# Patient Record
Sex: FEMALE | Race: WHITE | NOT HISPANIC OR LATINO
[De-identification: names, ages, dates, MRNs, and addresses within clinical notes are randomized per-mention and may not be internally consistent; named-entity substitution may affect disease eponyms.]

---

## 2017-02-23 ENCOUNTER — APPOINTMENT (OUTPATIENT)
Dept: OBGYN | Facility: CLINIC | Age: 32
End: 2017-02-23

## 2017-02-23 VITALS — SYSTOLIC BLOOD PRESSURE: 122 MMHG | HEIGHT: 65 IN | DIASTOLIC BLOOD PRESSURE: 82 MMHG

## 2017-02-23 DIAGNOSIS — Z30.9 ENCOUNTER FOR CONTRACEPTIVE MANAGEMENT, UNSPECIFIED: ICD-10-CM

## 2017-02-23 DIAGNOSIS — T19.2XXA FOREIGN BODY IN VULVA AND VAGINA, INITIAL ENCOUNTER: ICD-10-CM

## 2017-02-23 DIAGNOSIS — N92.0 EXCESSIVE AND FREQUENT MENSTRUATION WITH REGULAR CYCLE: ICD-10-CM

## 2017-02-23 RX ORDER — FAMOTIDINE 40 MG/1
40 TABLET, FILM COATED ORAL
Qty: 30 | Refills: 0 | Status: COMPLETED | COMMUNITY
Start: 2016-12-08

## 2017-02-23 RX ORDER — FLUCONAZOLE 150 MG/1
150 TABLET ORAL
Qty: 1 | Refills: 0 | Status: COMPLETED | COMMUNITY
Start: 2016-10-11

## 2017-02-23 RX ORDER — CEPHALEXIN 500 MG/1
500 CAPSULE ORAL
Qty: 40 | Refills: 0 | Status: COMPLETED | COMMUNITY
Start: 2016-10-27

## 2017-02-23 RX ORDER — HYDROCODONE BITARTRATE AND ACETAMINOPHEN 7.5; 325 MG/1; MG/1
7.5-325 TABLET ORAL
Qty: 10 | Refills: 0 | Status: COMPLETED | COMMUNITY
Start: 2017-02-11

## 2017-02-23 RX ORDER — AMOXICILLIN AND CLAVULANATE POTASSIUM 875; 125 MG/1; MG/1
875-125 TABLET, COATED ORAL
Qty: 20 | Refills: 0 | Status: COMPLETED | COMMUNITY
Start: 2016-12-08

## 2017-02-28 ENCOUNTER — APPOINTMENT (OUTPATIENT)
Dept: OBGYN | Facility: CLINIC | Age: 32
End: 2017-02-28

## 2017-02-28 DIAGNOSIS — Z30.430 ENCOUNTER FOR INSERTION OF INTRAUTERINE CONTRACEPTIVE DEVICE: ICD-10-CM

## 2017-03-28 ENCOUNTER — APPOINTMENT (OUTPATIENT)
Dept: OBGYN | Facility: CLINIC | Age: 32
End: 2017-03-28

## 2017-04-27 ENCOUNTER — APPOINTMENT (OUTPATIENT)
Dept: OBGYN | Facility: CLINIC | Age: 32
End: 2017-04-27

## 2017-04-27 VITALS
DIASTOLIC BLOOD PRESSURE: 79 MMHG | BODY MASS INDEX: 22.49 KG/M2 | WEIGHT: 135 LBS | SYSTOLIC BLOOD PRESSURE: 119 MMHG | HEIGHT: 65 IN

## 2017-04-27 DIAGNOSIS — N93.8 OTHER SPECIFIED ABNORMAL UTERINE AND VAGINAL BLEEDING: ICD-10-CM

## 2017-04-27 RX ORDER — MEDROXYPROGESTERONE ACETATE 10 MG/1
10 TABLET ORAL DAILY
Qty: 10 | Refills: 1 | Status: ACTIVE | COMMUNITY
Start: 2017-04-27 | End: 1900-01-01

## 2017-05-01 ENCOUNTER — APPOINTMENT (OUTPATIENT)
Dept: OBGYN | Facility: CLINIC | Age: 32
End: 2017-05-01

## 2017-07-14 ENCOUNTER — APPOINTMENT (OUTPATIENT)
Dept: OBGYN | Facility: CLINIC | Age: 32
End: 2017-07-14

## 2020-05-23 ENCOUNTER — MESSAGE (OUTPATIENT)
Age: 35
End: 2020-05-23

## 2020-05-23 ENCOUNTER — APPOINTMENT (OUTPATIENT)
Dept: DISASTER EMERGENCY | Facility: CLINIC | Age: 35
End: 2020-05-23

## 2020-05-23 LAB
SARS-COV-2 IGG SERPL IA-ACNC: 0.1 INDEX
SARS-COV-2 IGG SERPL QL IA: NEGATIVE

## 2020-10-07 ENCOUNTER — APPOINTMENT (OUTPATIENT)
Dept: ORTHOPEDIC SURGERY | Facility: CLINIC | Age: 35
End: 2020-10-07

## 2020-10-07 DIAGNOSIS — M54.5 LOW BACK PAIN: ICD-10-CM

## 2020-12-28 ENCOUNTER — TRANSCRIPTION ENCOUNTER (OUTPATIENT)
Age: 35
End: 2020-12-28

## 2021-09-29 ENCOUNTER — TRANSCRIPTION ENCOUNTER (OUTPATIENT)
Age: 36
End: 2021-09-29

## 2021-10-13 ENCOUNTER — RESULT REVIEW (OUTPATIENT)
Age: 36
End: 2021-10-13

## 2025-03-06 ENCOUNTER — EMERGENCY (EMERGENCY)
Facility: HOSPITAL | Age: 40
LOS: 1 days | Discharge: ANOTHER HOSPITAL | End: 2025-03-06
Attending: EMERGENCY MEDICINE | Admitting: EMERGENCY MEDICINE
Payer: MEDICAID

## 2025-03-06 ENCOUNTER — INPATIENT (INPATIENT)
Facility: HOSPITAL | Age: 40
LOS: 1 days | Discharge: HOME | End: 2025-03-08
Attending: STUDENT IN AN ORGANIZED HEALTH CARE EDUCATION/TRAINING PROGRAM | Admitting: STUDENT IN AN ORGANIZED HEALTH CARE EDUCATION/TRAINING PROGRAM
Payer: MEDICAID

## 2025-03-06 VITALS
RESPIRATION RATE: 17 BRPM | TEMPERATURE: 98 F | DIASTOLIC BLOOD PRESSURE: 88 MMHG | SYSTOLIC BLOOD PRESSURE: 126 MMHG | HEART RATE: 111 BPM | OXYGEN SATURATION: 97 % | WEIGHT: 139.99 LBS | HEIGHT: 65 IN

## 2025-03-06 VITALS
RESPIRATION RATE: 18 BRPM | SYSTOLIC BLOOD PRESSURE: 131 MMHG | DIASTOLIC BLOOD PRESSURE: 78 MMHG | HEART RATE: 114 BPM | OXYGEN SATURATION: 100 %

## 2025-03-06 VITALS
DIASTOLIC BLOOD PRESSURE: 90 MMHG | SYSTOLIC BLOOD PRESSURE: 123 MMHG | TEMPERATURE: 99 F | OXYGEN SATURATION: 96 % | HEIGHT: 65 IN | RESPIRATION RATE: 18 BRPM | WEIGHT: 141.1 LBS | HEART RATE: 126 BPM

## 2025-03-06 LAB
ALBUMIN SERPL ELPH-MCNC: 1.6 G/DL — LOW (ref 3.3–5)
ALP SERPL-CCNC: 230 U/L — HIGH (ref 40–120)
ALT FLD-CCNC: 30 U/L — SIGNIFICANT CHANGE UP (ref 10–45)
ANION GAP SERPL CALC-SCNC: 7 MMOL/L — SIGNIFICANT CHANGE UP (ref 5–17)
APPEARANCE UR: CLEAR — SIGNIFICANT CHANGE UP
APTT BLD: 27.4 SEC — SIGNIFICANT CHANGE UP (ref 24.5–35.6)
AST SERPL-CCNC: 17 U/L — SIGNIFICANT CHANGE UP (ref 10–40)
BACTERIA # UR AUTO: ABNORMAL /HPF
BASOPHILS # BLD AUTO: 0.07 K/UL — SIGNIFICANT CHANGE UP (ref 0–0.2)
BASOPHILS NFR BLD AUTO: 0.3 % — SIGNIFICANT CHANGE UP (ref 0–2)
BILIRUB SERPL-MCNC: 2.1 MG/DL — HIGH (ref 0.2–1.2)
BILIRUB UR-MCNC: ABNORMAL
BUN SERPL-MCNC: 23 MG/DL — SIGNIFICANT CHANGE UP (ref 7–23)
CALCIUM SERPL-MCNC: 9.1 MG/DL — SIGNIFICANT CHANGE UP (ref 8.4–10.5)
CHLORIDE SERPL-SCNC: 102 MMOL/L — SIGNIFICANT CHANGE UP (ref 96–108)
CO2 SERPL-SCNC: 26 MMOL/L — SIGNIFICANT CHANGE UP (ref 22–31)
COLOR SPEC: SIGNIFICANT CHANGE UP
CREAT SERPL-MCNC: 1.45 MG/DL — HIGH (ref 0.5–1.3)
DIFF PNL FLD: ABNORMAL
EGFR: 47 ML/MIN/1.73M2 — LOW
EOSINOPHIL # BLD AUTO: 0.08 K/UL — SIGNIFICANT CHANGE UP (ref 0–0.5)
EOSINOPHIL NFR BLD AUTO: 0.3 % — SIGNIFICANT CHANGE UP (ref 0–6)
EPI CELLS # UR: PRESENT
FLUAV AG NPH QL: SIGNIFICANT CHANGE UP
FLUBV AG NPH QL: SIGNIFICANT CHANGE UP
GLUCOSE SERPL-MCNC: 102 MG/DL — HIGH (ref 70–99)
GLUCOSE UR QL: NEGATIVE MG/DL — SIGNIFICANT CHANGE UP
HCG SERPL-ACNC: <1 MIU/ML — SIGNIFICANT CHANGE UP
HCT VFR BLD CALC: 30.4 % — LOW (ref 34.5–45)
HGB BLD-MCNC: 10.2 G/DL — LOW (ref 11.5–15.5)
IMM GRANULOCYTES NFR BLD AUTO: 0.5 % — SIGNIFICANT CHANGE UP (ref 0–0.9)
INR BLD: 1.51 RATIO — HIGH (ref 0.85–1.16)
KETONES UR-MCNC: NEGATIVE MG/DL — SIGNIFICANT CHANGE UP
LACTATE SERPL-SCNC: 0.9 MMOL/L — SIGNIFICANT CHANGE UP (ref 0.7–2)
LEUKOCYTE ESTERASE UR-ACNC: ABNORMAL
LIDOCAIN IGE QN: 45 U/L — SIGNIFICANT CHANGE UP (ref 16–77)
LYMPHOCYTES # BLD AUTO: 1.16 K/UL — SIGNIFICANT CHANGE UP (ref 1–3.3)
LYMPHOCYTES # BLD AUTO: 4.4 % — LOW (ref 13–44)
MCHC RBC-ENTMCNC: 28.8 PG — SIGNIFICANT CHANGE UP (ref 27–34)
MCHC RBC-ENTMCNC: 33.6 G/DL — SIGNIFICANT CHANGE UP (ref 32–36)
MCV RBC AUTO: 85.9 FL — SIGNIFICANT CHANGE UP (ref 80–100)
MONOCYTES # BLD AUTO: 2.02 K/UL — HIGH (ref 0–0.9)
MONOCYTES NFR BLD AUTO: 7.7 % — SIGNIFICANT CHANGE UP (ref 2–14)
NEUTROPHILS # BLD AUTO: 22.75 K/UL — HIGH (ref 1.8–7.4)
NEUTROPHILS NFR BLD AUTO: 86.8 % — HIGH (ref 43–77)
NITRITE UR-MCNC: POSITIVE
NRBC BLD AUTO-RTO: 0 /100 WBCS — SIGNIFICANT CHANGE UP (ref 0–0)
PH UR: 6.5 — SIGNIFICANT CHANGE UP (ref 5–8)
PLATELET # BLD AUTO: 492 K/UL — HIGH (ref 150–400)
POTASSIUM SERPL-MCNC: 3.4 MMOL/L — LOW (ref 3.5–5.3)
POTASSIUM SERPL-SCNC: 3.4 MMOL/L — LOW (ref 3.5–5.3)
PROCALCITONIN SERPL-MCNC: 2.13 NG/ML — HIGH
PROT SERPL-MCNC: 5.9 G/DL — LOW (ref 6–8.3)
PROT UR-MCNC: 100 MG/DL
PROTHROM AB SERPL-ACNC: 17.7 SEC — HIGH (ref 9.9–13.4)
RBC # BLD: 3.54 M/UL — LOW (ref 3.8–5.2)
RBC # FLD: 13.3 % — SIGNIFICANT CHANGE UP (ref 10.3–14.5)
RBC CASTS # UR COMP ASSIST: 5 /HPF — HIGH (ref 0–4)
RSV RNA NPH QL NAA+NON-PROBE: SIGNIFICANT CHANGE UP
SARS-COV-2 RNA SPEC QL NAA+PROBE: SIGNIFICANT CHANGE UP
SODIUM SERPL-SCNC: 135 MMOL/L — SIGNIFICANT CHANGE UP (ref 135–145)
SP GR SPEC: 1.01 — SIGNIFICANT CHANGE UP (ref 1–1.03)
UROBILINOGEN FLD QL: 1 MG/DL — SIGNIFICANT CHANGE UP (ref 0.2–1)
WBC # BLD: 26.21 K/UL — HIGH (ref 3.8–10.5)
WBC # FLD AUTO: 26.21 K/UL — HIGH (ref 3.8–10.5)
WBC UR QL: 10 /HPF — HIGH (ref 0–5)

## 2025-03-06 PROCEDURE — 84145 PROCALCITONIN (PCT): CPT

## 2025-03-06 PROCEDURE — 85025 COMPLETE CBC W/AUTO DIFF WBC: CPT

## 2025-03-06 PROCEDURE — 96375 TX/PRO/DX INJ NEW DRUG ADDON: CPT

## 2025-03-06 PROCEDURE — 99285 EMERGENCY DEPT VISIT HI MDM: CPT

## 2025-03-06 PROCEDURE — 80053 COMPREHEN METABOLIC PANEL: CPT

## 2025-03-06 PROCEDURE — 85730 THROMBOPLASTIN TIME PARTIAL: CPT

## 2025-03-06 PROCEDURE — 99285 EMERGENCY DEPT VISIT HI MDM: CPT | Mod: 25

## 2025-03-06 PROCEDURE — 36415 COLL VENOUS BLD VENIPUNCTURE: CPT

## 2025-03-06 PROCEDURE — 84702 CHORIONIC GONADOTROPIN TEST: CPT

## 2025-03-06 PROCEDURE — 87086 URINE CULTURE/COLONY COUNT: CPT

## 2025-03-06 PROCEDURE — 96361 HYDRATE IV INFUSION ADD-ON: CPT

## 2025-03-06 PROCEDURE — 85610 PROTHROMBIN TIME: CPT

## 2025-03-06 PROCEDURE — 81001 URINALYSIS AUTO W/SCOPE: CPT

## 2025-03-06 PROCEDURE — 74177 CT ABD & PELVIS W/CONTRAST: CPT | Mod: 26

## 2025-03-06 PROCEDURE — 96365 THER/PROPH/DIAG IV INF INIT: CPT | Mod: XU

## 2025-03-06 PROCEDURE — 74177 CT ABD & PELVIS W/CONTRAST: CPT | Mod: MC

## 2025-03-06 PROCEDURE — 87040 BLOOD CULTURE FOR BACTERIA: CPT

## 2025-03-06 PROCEDURE — 83690 ASSAY OF LIPASE: CPT

## 2025-03-06 PROCEDURE — 87637 SARSCOV2&INF A&B&RSV AMP PRB: CPT

## 2025-03-06 PROCEDURE — 96376 TX/PRO/DX INJ SAME DRUG ADON: CPT

## 2025-03-06 PROCEDURE — 83605 ASSAY OF LACTIC ACID: CPT

## 2025-03-06 RX ORDER — ACETAMINOPHEN 500 MG/5ML
1000 LIQUID (ML) ORAL ONCE
Refills: 0 | Status: COMPLETED | OUTPATIENT
Start: 2025-03-06 | End: 2025-03-06

## 2025-03-06 RX ORDER — CEFTRIAXONE 500 MG/1
1000 INJECTION, POWDER, FOR SOLUTION INTRAMUSCULAR; INTRAVENOUS ONCE
Refills: 0 | Status: COMPLETED | OUTPATIENT
Start: 2025-03-06 | End: 2025-03-06

## 2025-03-06 RX ORDER — ONDANSETRON HCL/PF 4 MG/2 ML
4 VIAL (ML) INJECTION ONCE
Refills: 0 | Status: COMPLETED | OUTPATIENT
Start: 2025-03-06 | End: 2025-03-06

## 2025-03-06 RX ORDER — KETOROLAC TROMETHAMINE 30 MG/ML
15 INJECTION, SOLUTION INTRAMUSCULAR; INTRAVENOUS ONCE
Refills: 0 | Status: DISCONTINUED | OUTPATIENT
Start: 2025-03-06 | End: 2025-03-06

## 2025-03-06 RX ORDER — OXYCODONE HYDROCHLORIDE 30 MG/1
5 TABLET ORAL ONCE
Refills: 0 | Status: DISCONTINUED | OUTPATIENT
Start: 2025-03-06 | End: 2025-03-06

## 2025-03-06 RX ADMIN — Medication 4 MILLIGRAM(S): at 21:27

## 2025-03-06 RX ADMIN — Medication 1000 MILLILITER(S): at 19:27

## 2025-03-06 RX ADMIN — KETOROLAC TROMETHAMINE 15 MILLIGRAM(S): 30 INJECTION, SOLUTION INTRAMUSCULAR; INTRAVENOUS at 20:38

## 2025-03-06 RX ADMIN — Medication 4 MILLIGRAM(S): at 18:24

## 2025-03-06 RX ADMIN — Medication 1000 MILLILITER(S): at 18:26

## 2025-03-06 RX ADMIN — OXYCODONE HYDROCHLORIDE 5 MILLIGRAM(S): 30 TABLET ORAL at 21:57

## 2025-03-06 RX ADMIN — OXYCODONE HYDROCHLORIDE 5 MILLIGRAM(S): 30 TABLET ORAL at 21:27

## 2025-03-06 RX ADMIN — KETOROLAC TROMETHAMINE 15 MILLIGRAM(S): 30 INJECTION, SOLUTION INTRAMUSCULAR; INTRAVENOUS at 20:08

## 2025-03-06 RX ADMIN — Medication 400 MILLIGRAM(S): at 18:25

## 2025-03-06 RX ADMIN — Medication 1000 MILLIGRAM(S): at 18:40

## 2025-03-06 RX ADMIN — Medication 1000 MILLIGRAM(S): at 18:55

## 2025-03-06 RX ADMIN — CEFTRIAXONE 100 MILLIGRAM(S): 500 INJECTION, POWDER, FOR SOLUTION INTRAMUSCULAR; INTRAVENOUS at 18:36

## 2025-03-06 RX ADMIN — Medication 1000 MILLILITER(S): at 20:00

## 2025-03-06 RX ADMIN — Medication 1000 MILLILITER(S): at 18:27

## 2025-03-06 RX ADMIN — CEFTRIAXONE 1000 MILLIGRAM(S): 500 INJECTION, POWDER, FOR SOLUTION INTRAMUSCULAR; INTRAVENOUS at 19:06

## 2025-03-06 NOTE — ED ADULT TRIAGE NOTE - CHIEF COMPLAINT QUOTE
pt c/o x5 days LLQ abdominal pain associated with nausea w/o vomiting. transfer from [facility] for urosepsis. pt noted to have on CT scan x2 kidney stones in L ureter. pt appears comfortable at this time. received 5mg of morphine at 10:32pm.   h/o fibroids

## 2025-03-06 NOTE — ED PROVIDER NOTE - OBJECTIVE STATEMENT
39-year-old female with fibroids presenting ED for evaluation of generalized abdominal pain, fever Tmax 103 °F orally, abdominal bloating, urinary urgency, frequency and dysuria for the past 5 days.  Has been taking Azo at home.  Patient concerned for UTI.  No history of kidney stones.

## 2025-03-06 NOTE — ED PROVIDER NOTE - CARE PLAN
Principal Discharge DX:	Kidney stone  Secondary Diagnosis:	Sepsis  Secondary Diagnosis:	Acute UTI  Secondary Diagnosis:	DEVANG (acute kidney injury)   1

## 2025-03-06 NOTE — ED PROVIDER NOTE - CLINICAL SUMMARY MEDICAL DECISION MAKING FREE TEXT BOX
labs, CT abdomen, IVFs, zofran, ofirmev  tachycardic on ED arrival. labs, CT abdomen, IVFs, zofran, ofirmev  tachycardic on ED arrival.  leukocytosis of 26K  UTI on labs

## 2025-03-06 NOTE — ED ADULT NURSE NOTE - OBJECTIVE STATEMENT
Pt came from home with c/o left sided abdominal pain, bloating, urinary urgency, fevers and nausea. Pt with hx uterine fibroids. Reports tmax 103 at home, taking Tylenol at home with minimal relief.

## 2025-03-06 NOTE — ED PROVIDER NOTE - PHYSICAL EXAMINATION
Constitutional: NAD AAOx3  Eyes: EOMI, pupils equal  Head: Normocephalic atraumatic  Mouth: no airway obstruction  Cardiac: tachycardic, regular rhythm  Resp: Lungs CTAB  GI: generalized TTP, worse in LLQ, nonperitoneal.  Neuro: CN2-12 intact  Skin: No rashes

## 2025-03-06 NOTE — ED ADULT NURSE NOTE - NSFALLUNIVINTERV_ED_ALL_ED
Bed/Stretcher in lowest position, wheels locked, appropriate side rails in place/Call bell, personal items and telephone in reach/Instruct patient to call for assistance before getting out of bed/chair/stretcher/Non-slip footwear applied when patient is off stretcher/[place] to call system/Physically safe environment - no spills, clutter or unnecessary equipment/Purposeful proactive rounding/Room/bathroom lighting operational, light cord in reach

## 2025-03-06 NOTE — ED ADULT TRIAGE NOTE - TEMP AT ED ARRIVAL (C)
The day before surgery you will receive a phone call from the surgery nurse to let you know what time to arrive on the day of surgery. This call will usually be between 2-4 PM. If you do not receive a phone call by 4 PM the day before your surgery please call [phone] and let them know you have not received an arrival time. If your surgery is on Monday, your call will be on the Friday before your Monday surgery.    Chlorhexidine Gluconate 4% Solution    Patient should shower with this soap a minimum of 3 consecutive showers (2 nights before surgery, the night before surgery and the morning of surgery) washing from the neck down (avoiding contact with genitalia).      DO NOT WASH YOUR HAIR OR FACE WITH THIS SOAP.  When washing with this soap, apply enough to suds up the body thoroughly, turn the water away from your body and allow the soap suds to remain on the body for 2 full minutes, then rinse body completely.      After using this soap on the body, please do not apply powders or lotions to your body.  After the shower the night before surgery, please dry off with a new towel, sleep in new freshly laundered pj's, and change your bed linen before going to sleep.      IF YOU HAVE A PET IN YOUR HOME, please do not allow your pet to sleep in the bed with you after you have showered with your surgery prep soap.     Please remember that it is not recommended to allow your pet to sleep with you post op, until your incision has healed.  This can increase your risk of post op infection.    PREOPERATIVE GUIDELINES WHEN RECEIVING ANESTHESIA    Do not eat or drink anything after midnight, the night before your surgery. No gum or candy the morning of surgery.  This is extremely important for your safety.    Take a bath (or shower) the night before your surgery and you may brush your teeth the morning of your surgery.    You will be scheduled to arrive at the hospital 2 hours before your surgery, or follow your surgeon's 
36.5

## 2025-03-06 NOTE — ED ADULT TRIAGE NOTE - CHIEF COMPLAINT QUOTE
Patient complaint of abdominal pain, fever, nausea and constipation. Patient also complaint of LLQ pain, PMH of fibrinoids. Patient also complaint of urinary symptoms.

## 2025-03-07 ENCOUNTER — TRANSCRIPTION ENCOUNTER (OUTPATIENT)
Age: 40
End: 2025-03-07

## 2025-03-07 DIAGNOSIS — N20.0 CALCULUS OF KIDNEY: ICD-10-CM

## 2025-03-07 LAB
ANION GAP SERPL CALC-SCNC: 12 MMOL/L — SIGNIFICANT CHANGE UP (ref 7–14)
BUN SERPL-MCNC: 19 MG/DL — SIGNIFICANT CHANGE UP (ref 7–23)
CALCIUM SERPL-MCNC: 9.3 MG/DL — SIGNIFICANT CHANGE UP (ref 8.4–10.5)
CHLORIDE SERPL-SCNC: 102 MMOL/L — SIGNIFICANT CHANGE UP (ref 98–107)
CO2 SERPL-SCNC: 20 MMOL/L — LOW (ref 22–31)
CREAT SERPL-MCNC: 1.27 MG/DL — SIGNIFICANT CHANGE UP (ref 0.5–1.3)
EGFR: 56 ML/MIN/1.73M2 — LOW
EGFR: 56 ML/MIN/1.73M2 — LOW
GLUCOSE SERPL-MCNC: 78 MG/DL — SIGNIFICANT CHANGE UP (ref 70–99)
HCT VFR BLD CALC: 30.2 % — LOW (ref 34.5–45)
HGB BLD-MCNC: 9.9 G/DL — LOW (ref 11.5–15.5)
MCHC RBC-ENTMCNC: 28.5 PG — SIGNIFICANT CHANGE UP (ref 27–34)
MCHC RBC-ENTMCNC: 32.8 G/DL — SIGNIFICANT CHANGE UP (ref 32–36)
MCV RBC AUTO: 87 FL — SIGNIFICANT CHANGE UP (ref 80–100)
NRBC # BLD AUTO: 0 K/UL — SIGNIFICANT CHANGE UP (ref 0–0)
NRBC # FLD: 0 K/UL — SIGNIFICANT CHANGE UP (ref 0–0)
NRBC BLD AUTO-RTO: 0 /100 WBCS — SIGNIFICANT CHANGE UP (ref 0–0)
PLATELET # BLD AUTO: 429 K/UL — HIGH (ref 150–400)
POTASSIUM SERPL-MCNC: 3.6 MMOL/L — SIGNIFICANT CHANGE UP (ref 3.5–5.3)
POTASSIUM SERPL-SCNC: 3.6 MMOL/L — SIGNIFICANT CHANGE UP (ref 3.5–5.3)
RBC # BLD: 3.47 M/UL — LOW (ref 3.8–5.2)
RBC # FLD: 13.7 % — SIGNIFICANT CHANGE UP (ref 10.3–14.5)
SODIUM SERPL-SCNC: 134 MMOL/L — LOW (ref 135–145)
WBC # BLD: 20.36 K/UL — HIGH (ref 3.8–10.5)
WBC # FLD AUTO: 20.36 K/UL — HIGH (ref 3.8–10.5)

## 2025-03-07 PROCEDURE — 52332 CYSTOSCOPY AND TREATMENT: CPT | Mod: LT

## 2025-03-07 PROCEDURE — 99222 1ST HOSP IP/OBS MODERATE 55: CPT | Mod: 25,57

## 2025-03-07 PROCEDURE — 74420 UROGRAPHY RTRGR +-KUB: CPT | Mod: 26,LT

## 2025-03-07 DEVICE — URETERAL CATH OPEN END 5FR 70CM: Type: IMPLANTABLE DEVICE | Site: LEFT | Status: FUNCTIONAL

## 2025-03-07 DEVICE — GUIDEWIRE SENSOR DUAL-FLEX NITINOL ANGLED .038" X 150CM: Type: IMPLANTABLE DEVICE | Site: LEFT | Status: FUNCTIONAL

## 2025-03-07 DEVICE — URETERAL STENT PERCUFLEX PLUS 6FR 28CM: Type: IMPLANTABLE DEVICE | Site: LEFT | Status: FUNCTIONAL

## 2025-03-07 RX ORDER — DIAZEPAM 2 MG/1
2 TABLET ORAL ONCE
Refills: 0 | Status: DISCONTINUED | OUTPATIENT
Start: 2025-03-07 | End: 2025-03-07

## 2025-03-07 RX ORDER — SODIUM CHLORIDE 9 G/1000ML
1000 INJECTION, SOLUTION INTRAVENOUS
Refills: 0 | Status: DISCONTINUED | OUTPATIENT
Start: 2025-03-07 | End: 2025-03-07

## 2025-03-07 RX ORDER — ACETAMINOPHEN 500 MG/5ML
975 LIQUID (ML) ORAL EVERY 6 HOURS
Refills: 0 | Status: DISCONTINUED | OUTPATIENT
Start: 2025-03-07 | End: 2025-03-08

## 2025-03-07 RX ORDER — SENNA 187 MG
2 TABLET ORAL AT BEDTIME
Refills: 0 | Status: DISCONTINUED | OUTPATIENT
Start: 2025-03-07 | End: 2025-03-08

## 2025-03-07 RX ORDER — POLYETHYLENE GLYCOL 3350 17 G/17G
17 POWDER, FOR SOLUTION ORAL DAILY
Refills: 0 | Status: DISCONTINUED | OUTPATIENT
Start: 2025-03-07 | End: 2025-03-08

## 2025-03-07 RX ORDER — ONDANSETRON HCL/PF 4 MG/2 ML
4 VIAL (ML) INJECTION ONCE
Refills: 0 | Status: DISCONTINUED | OUTPATIENT
Start: 2025-03-07 | End: 2025-03-07

## 2025-03-07 RX ORDER — PHENAZOPYRIDINE HCL 100 MG
200 TABLET ORAL EVERY 8 HOURS
Refills: 0 | Status: DISCONTINUED | OUTPATIENT
Start: 2025-03-07 | End: 2025-03-08

## 2025-03-07 RX ORDER — OXYCODONE HYDROCHLORIDE 30 MG/1
5 TABLET ORAL EVERY 4 HOURS
Refills: 0 | Status: DISCONTINUED | OUTPATIENT
Start: 2025-03-07 | End: 2025-03-08

## 2025-03-07 RX ORDER — TAMSULOSIN HYDROCHLORIDE 0.4 MG/1
0.4 CAPSULE ORAL AT BEDTIME
Refills: 0 | Status: DISCONTINUED | OUTPATIENT
Start: 2025-03-07 | End: 2025-03-08

## 2025-03-07 RX ORDER — FENTANYL CITRATE-0.9 % NACL/PF 100MCG/2ML
25 SYRINGE (ML) INTRAVENOUS
Refills: 0 | Status: DISCONTINUED | OUTPATIENT
Start: 2025-03-07 | End: 2025-03-07

## 2025-03-07 RX ORDER — KETOROLAC TROMETHAMINE 30 MG/ML
15 INJECTION, SOLUTION INTRAMUSCULAR; INTRAVENOUS EVERY 6 HOURS
Refills: 0 | Status: DISCONTINUED | OUTPATIENT
Start: 2025-03-07 | End: 2025-03-08

## 2025-03-07 RX ORDER — NICOTINE POLACRILEX 4 MG/1
1 GUM, CHEWING ORAL DAILY
Refills: 0 | Status: DISCONTINUED | OUTPATIENT
Start: 2025-03-07 | End: 2025-03-08

## 2025-03-07 RX ORDER — OXYCODONE HYDROCHLORIDE 30 MG/1
10 TABLET ORAL EVERY 4 HOURS
Refills: 0 | Status: DISCONTINUED | OUTPATIENT
Start: 2025-03-07 | End: 2025-03-08

## 2025-03-07 RX ORDER — OXYBUTYNIN CHLORIDE 5 MG/1
5 TABLET, FILM COATED, EXTENDED RELEASE ORAL EVERY 8 HOURS
Refills: 0 | Status: DISCONTINUED | OUTPATIENT
Start: 2025-03-07 | End: 2025-03-08

## 2025-03-07 RX ORDER — ACETAMINOPHEN 500 MG/5ML
1000 LIQUID (ML) ORAL ONCE
Refills: 0 | Status: COMPLETED | OUTPATIENT
Start: 2025-03-07 | End: 2025-03-07

## 2025-03-07 RX ORDER — CEFTRIAXONE 500 MG/1
1000 INJECTION, POWDER, FOR SOLUTION INTRAMUSCULAR; INTRAVENOUS EVERY 24 HOURS
Refills: 0 | Status: DISCONTINUED | OUTPATIENT
Start: 2025-03-07 | End: 2025-03-08

## 2025-03-07 RX ORDER — FENTANYL CITRATE-0.9 % NACL/PF 100MCG/2ML
50 SYRINGE (ML) INTRAVENOUS
Refills: 0 | Status: DISCONTINUED | OUTPATIENT
Start: 2025-03-07 | End: 2025-03-07

## 2025-03-07 RX ORDER — HEPARIN SODIUM 1000 [USP'U]/ML
5000 INJECTION INTRAVENOUS; SUBCUTANEOUS EVERY 12 HOURS
Refills: 0 | Status: DISCONTINUED | OUTPATIENT
Start: 2025-03-07 | End: 2025-03-08

## 2025-03-07 RX ORDER — KETOROLAC TROMETHAMINE 30 MG/ML
15 INJECTION, SOLUTION INTRAMUSCULAR; INTRAVENOUS ONCE
Refills: 0 | Status: DISCONTINUED | OUTPATIENT
Start: 2025-03-07 | End: 2025-03-07

## 2025-03-07 RX ORDER — ONDANSETRON HCL/PF 4 MG/2 ML
4 VIAL (ML) INJECTION EVERY 6 HOURS
Refills: 0 | Status: DISCONTINUED | OUTPATIENT
Start: 2025-03-07 | End: 2025-03-08

## 2025-03-07 RX ADMIN — DIAZEPAM 2 MILLIGRAM(S): 2 TABLET ORAL at 06:54

## 2025-03-07 RX ADMIN — HEPARIN SODIUM 5000 UNIT(S): 1000 INJECTION INTRAVENOUS; SUBCUTANEOUS at 17:48

## 2025-03-07 RX ADMIN — Medication 200 MILLIGRAM(S): at 07:27

## 2025-03-07 RX ADMIN — Medication 975 MILLIGRAM(S): at 11:32

## 2025-03-07 RX ADMIN — Medication 400 MILLIGRAM(S): at 23:32

## 2025-03-07 RX ADMIN — OXYCODONE HYDROCHLORIDE 10 MILLIGRAM(S): 30 TABLET ORAL at 15:18

## 2025-03-07 RX ADMIN — OXYCODONE HYDROCHLORIDE 10 MILLIGRAM(S): 30 TABLET ORAL at 22:39

## 2025-03-07 RX ADMIN — OXYCODONE HYDROCHLORIDE 10 MILLIGRAM(S): 30 TABLET ORAL at 16:18

## 2025-03-07 RX ADMIN — Medication 40 MILLIGRAM(S): at 21:40

## 2025-03-07 RX ADMIN — KETOROLAC TROMETHAMINE 15 MILLIGRAM(S): 30 INJECTION, SOLUTION INTRAMUSCULAR; INTRAVENOUS at 01:34

## 2025-03-07 RX ADMIN — Medication 200 MILLIGRAM(S): at 21:57

## 2025-03-07 RX ADMIN — Medication 4 MILLIGRAM(S): at 01:34

## 2025-03-07 RX ADMIN — CEFTRIAXONE 100 MILLIGRAM(S): 500 INJECTION, POWDER, FOR SOLUTION INTRAMUSCULAR; INTRAVENOUS at 17:48

## 2025-03-07 RX ADMIN — Medication 25 MICROGRAM(S): at 06:30

## 2025-03-07 RX ADMIN — KETOROLAC TROMETHAMINE 15 MILLIGRAM(S): 30 INJECTION, SOLUTION INTRAMUSCULAR; INTRAVENOUS at 02:04

## 2025-03-07 RX ADMIN — OXYCODONE HYDROCHLORIDE 10 MILLIGRAM(S): 30 TABLET ORAL at 21:39

## 2025-03-07 RX ADMIN — KETOROLAC TROMETHAMINE 15 MILLIGRAM(S): 30 INJECTION, SOLUTION INTRAMUSCULAR; INTRAVENOUS at 13:28

## 2025-03-07 RX ADMIN — Medication 25 MICROGRAM(S): at 06:45

## 2025-03-07 RX ADMIN — OXYBUTYNIN CHLORIDE 5 MILLIGRAM(S): 5 TABLET, FILM COATED, EXTENDED RELEASE ORAL at 11:03

## 2025-03-07 RX ADMIN — SODIUM CHLORIDE 150 MILLILITER(S): 9 INJECTION, SOLUTION INTRAVENOUS at 01:34

## 2025-03-07 RX ADMIN — Medication 200 MILLIGRAM(S): at 15:17

## 2025-03-07 RX ADMIN — KETOROLAC TROMETHAMINE 15 MILLIGRAM(S): 30 INJECTION, SOLUTION INTRAMUSCULAR; INTRAVENOUS at 12:58

## 2025-03-07 RX ADMIN — HEPARIN SODIUM 5000 UNIT(S): 1000 INJECTION INTRAVENOUS; SUBCUTANEOUS at 06:36

## 2025-03-07 RX ADMIN — Medication 4 MILLIGRAM(S): at 02:04

## 2025-03-07 RX ADMIN — OXYBUTYNIN CHLORIDE 5 MILLIGRAM(S): 5 TABLET, FILM COATED, EXTENDED RELEASE ORAL at 21:40

## 2025-03-07 RX ADMIN — Medication 975 MILLIGRAM(S): at 17:48

## 2025-03-07 RX ADMIN — TAMSULOSIN HYDROCHLORIDE 0.4 MILLIGRAM(S): 0.4 CAPSULE ORAL at 21:39

## 2025-03-07 RX ADMIN — KETOROLAC TROMETHAMINE 15 MILLIGRAM(S): 30 INJECTION, SOLUTION INTRAMUSCULAR; INTRAVENOUS at 17:48

## 2025-03-07 NOTE — BRIEF OPERATIVE NOTE - NSICDXBRIEFPOSTOP_GEN_ALL_CORE_FT
POST-OP DIAGNOSIS:  Obstruction of left ureteropelvic junction (UPJ) due to stone 07-Mar-2025 06:14:47  Donovan Fong  Left ureteral stone 07-Mar-2025 06:14:54  Donovan Fong  Sepsis 07-Mar-2025 06:15:00  Donovan Fong

## 2025-03-07 NOTE — BRIEF OPERATIVE NOTE - NSICDXBRIEFPREOP_GEN_ALL_CORE_FT
PRE-OP DIAGNOSIS:  Obstruction of left ureteropelvic junction due to stone 07-Mar-2025 06:13:46  Donovan Fong  Left ureteral stone 07-Mar-2025 06:13:54  Donovan Fong  Sepsis 07-Mar-2025 06:14:09  Donovan Fong

## 2025-03-07 NOTE — H&P ADULT - NSHPLABSRESULTS_GEN_ALL_CORE
LABS:  CBC                       10.2   26.21 )-----------( 492      ( 06 Mar 2025 17:58 )             30.4     BMP       135  |  102  |  23  ----------------------------<  102[H]  3.4[L]   |  26  |  1.45[H]    Ca    9.1      06 Mar 2025 17:58    TPro  5.9[L]  /  Alb  1.6[L]  /  TBili  2.1[H]  /  DBili  x   /  AST  17  /  ALT  30  /  AlkPhos  230[H]      PT/INR - ( 06 Mar 2025 18:25 )   PT: 17.7 sec;   INR: 1.51 ratio         PTT - ( 06 Mar 2025 18:25 )  PTT:27.4 sec    Urinalysis Basic - ( 06 Mar 2025 18:25 )    Color: Dark Yellow / Appearance: Clear / S.008 / pH: x  Gluc: x / Ketone: Negative mg/dL  / Bili: Small / Urobili: 1.0 mg/dL   Blood: x / Protein: 100 mg/dL / Nitrite: Positive   Leuk Esterase: Moderate / RBC: 5 /HPF / WBC 10 /HPF   Sq Epi: x / Non Sq Epi: x / Bacteria: Moderate /HPF      Urine Cx: PENDING   Blood Cx: PENDING    Radiology:    < from: CT Abdomen and Pelvis w/ IV Cont (25 @ 20:08) >  ADRENALS: Within normal limits.  KIDNEYS/URETERS:  -There is a large 12 x 6 mm calculus in the left ureteropelvic junction   with moderate pelvic caliectasis. The left kidney is diffusely enlarged   and shows some subtle diminished enhancement in keeping with obstruction.   This large calculus is radiopaque on the  scanogram. A much smaller   3 mm calcification along the pathway of the mid left ureter probably   represents a second calculus although there is no upstream dilatation   from this site.  -No right-sided calculi or any definite right-sided hydronephrosis.  -No renal mass.    BLADDER: Within normal limits.  REPRODUCTIVE ORGANS: Enlarged fibroid uterus.    BOWEL: Mild diverticulosis left hemicolon with no evidence of acute   diverticulitis. Moderate stool throughout the colon. No bowel   obstruction. Appendix contains a small amountof contrast and/or at least   one small appendicolith as well as some gas, with no acute inflammatory   findings.  PERITONEUM/RETROPERITONEUM: Within normal limits.  VESSELS: Within normal limits.  LYMPH NODES: No lymphadenopathy.  ABDOMINAL WALL: Within normal limits.  BONES: Within normal limits.    IMPRESSION:    1. A large 12 x 6 mm radiopaque calculus in the left UPJ causing moderate   hydronephrosis. Probable second 3 mm calculus in the mid left ureter,   which is nonobstructing.    2. No right-sided urinary calculi.    3. Enlarged fibroid uterus.    4. Mild diverticulosis of the left hemicolon with no evidence of acute   diverticulitis.    --- End of Report ---    < end of copied text >

## 2025-03-07 NOTE — ED PROVIDER NOTE - OBJECTIVE STATEMENT
38 y/o otherwise healthy F transferred from [place] for urology evaluation of L sided septic stone.  Pt reports 2 weeks of intermittent UTI sxs.  Developed fevers since Sunday, Tmax 103F.  For the past 3 days has been having worsening L sided abd pain.  Pt found to have positive UA and CT showing 12x6mm L UVJ stone with hydronephrosis at OSH.  Rec'd pain medications, IVFs, and IV ceftriaxone - transferred to [facility] for urology evaluation.

## 2025-03-07 NOTE — ED ADULT NURSE NOTE - NSFALLUNIVINTERV_ED_ALL_ED
Bed/Stretcher in lowest position, wheels locked, appropriate side rails in place/Call bell, personal items and telephone in reach/Instruct patient to call for assistance before getting out of bed/chair/stretcher/Non-slip footwear applied when patient is off stretcher/[place] to call system/Physically safe environment - no spills, clutter or unnecessary equipment/Purposeful proactive rounding/Room/bathroom lighting operational, light cord in reach

## 2025-03-07 NOTE — H&P ADULT - ATTENDING COMMENTS
Agree with above  Transfer from [place]  1.2cm L UPJ with hydro. WBC elevated.  To OR for left stent

## 2025-03-07 NOTE — H&P ADULT - NSHPPHYSICALEXAM_GEN_ALL_CORE
PHYSICAL EXAM (Chaperon PA present during exam)  GENERAL: AAOx3, NAD, speak complete sentences  HEENT: NC/AT, EOMI, anicteric, moist mucous membrane. No facial droop. Conjunctiva clear.  NECK: supple, Full ROM  RESP: NRE, no accessory muscle use.  CVS: RRR  ABD: soft, no TTP, no Rebound tenderness/Guarding/Rigidity.  BACK: no CVAT b/l, No midline or paraspinal tenderness.   EXTREMITIES: Full ROM, no b/l LE edema.   NEURO: CN 2-12 grossly intact.  PSYCH: approp mood/affect

## 2025-03-07 NOTE — ED ADULT NURSE NOTE - OBJECTIVE STATEMENT
Patient alert and oriented x4, arrived to 10A, complaining of abdominal pain, labs drawn and saline lock placed, Medicated as ordered. Safety maintained

## 2025-03-07 NOTE — H&P ADULT - ASSESSMENT
39F PMHx uterine fibroids transfer from  ED septic stone. Pt c/o nonradiating LLQ abd pain a/w dysuria, increase urinary freq/urgency x2wks, and Fever Tmax 103F on 3/4/25. Sx temporarily relived with Azo and enema.  ED labs notable for WBC 26.21, Cr 1.45. CT A&P W done at  ED notable for 12x6mm L UPJ stone w moderated pelvic caliectasis, L kidney diffusely enlarged, and a 3mm L mid ureteral stone.     PLAN  -NPO for OR for emergent L ureteral stent.   -C/w Ceftriaxone  -FU UCX and BCX    DISCUSSED WITH UROLOGY ATTENDING DR. LING    [facility]  [address] [place] [address] [phone]   39F PMHx uterine fibroids p/w LLQ abd pain and UTI Sx, transfer from  ED for septic stone. VS notable for P 113, otherwise stable. WBC 26.21, Cr 1.45. Hct 30.4. LA 0.9. CT A&P W at  ED notable for 12x6mm L UPJ stone w moderated pelvic caliectasis, L kidney diffusely enlarged, and a 3mm L mid ureteral stone.  ED Tx: Ceftriaxone, Toradol, Tylenol, and Zofran. UCX and BCX PENDING.     PLAN  -NPO/IVF for OR for emergent L ureteral stent.   -C/w Ceftriaxone  -FU UCX and BCX  -Pain control.     DISCUSSED WITH UROLOGY ATTENDING DR. SUSHIL [facility]  [address] [place] [address] [phone]

## 2025-03-07 NOTE — H&P ADULT - HISTORY OF PRESENT ILLNESS
39F PMHx uterine fibroids transfer from  ED septic stone. Pt c/o nonradiating LLQ abd pain a/w dysuria, increase urinary freq/urgency x2wks, and Fever Tmax 103F on 3/4/25. Sx temporarily relived with Azo and enema.  ED labs notable for WBC 26.21, Cr 1.45. CT A&P W done at  ED notable for 12x6mm L UPJ stone w moderated pelvic caliectasis, L kidney diffusely enlarged, and a 3mm L mid ureteral stone.     PAST MEDICAL & SURGICAL HISTORY:  No pertinent past medical history    FAMILY HISTORY:  Mother passed away from uterine Ca.     SOCIAL HISTORY  Denies alcohol and drug abuse, nonsmoker     MEDICATIONS  (STANDING):  ketorolac   Injectable 15 milliGRAM(s) IV Push Once  morphine  - Injectable 4 milliGRAM(s) IV Push Once    MEDICATIONS  (PRN):    Allergies  codeine (Rash, pruritis)    Intolerances    Vital signs  T(C): 36.6 (03-06-25 @ 23:58), Max: 37.2 (03-06-25 @ 17:26)  HR: 113 (03-06-25 @ 23:58)  BP: 118/72 (03-06-25 @ 23:58)  SpO2: 95% (03-06-25 @ 23:58)  Wt(kg): --     39F PMHx uterine fibroids transfer from  ED for septic stone. Pt c/o nonradiating LLQ abd pain and bloating x2wks, a/w dysuria, increase urinary freq/urgency, and Fever Tmax 103F on 3/4/25. Sx temporarily relived with Azo and enema.  ED labs notable for WBC 26.21, Cr 1.45. CT A&P W done at  ED notable for 12x6mm L UPJ stone w moderated pelvic caliectasis, L kidney diffusely enlarged, and a 3mm L mid ureteral stone.     PAST MEDICAL & SURGICAL HISTORY:  No pertinent past medical history    FAMILY HISTORY:  Mother passed away from uterine Ca.     SOCIAL HISTORY  Denies alcohol and drug abuse, nonsmoker     MEDICATIONS  (STANDING):  ketorolac   Injectable 15 milliGRAM(s) IV Push Once  morphine  - Injectable 4 milliGRAM(s) IV Push Once    MEDICATIONS  (PRN):    Allergies  codeine (Rash, pruritis)    Intolerances    Vital signs  T(C): 36.6 (03-06-25 @ 23:58), Max: 37.2 (03-06-25 @ 17:26)  HR: 113 (03-06-25 @ 23:58)  BP: 118/72 (03-06-25 @ 23:58)  SpO2: 95% (03-06-25 @ 23:58)  Wt(kg): --

## 2025-03-07 NOTE — ED ADULT NURSE NOTE - CHIEF COMPLAINT QUOTE
pt c/o x5 days LLQ abdominal pain associated with nausea w/o vomiting. transfer from [facility] for urosepsis. pt noted to have on CT scan x2 kidney stones in L ureter. pt appears comfortable at this time. received 5mg of morphine at 10:32pm.   h/o fibroids

## 2025-03-07 NOTE — ED ADULT NURSE NOTE - NSHOSCREENINGQ1_ED_ALL_ED
"Debridement    Date/Time: 11/8/2022 10:00 AM  Performed by: Rosalba Eaton PA-C  Authorized by: Rosalba Eaton PA-C     Time out: Immediately prior to procedure a "time out" was called to verify the correct patient, procedure, equipment, support staff and site/side marked as required.    Consent Done?:  Yes (Written)  Local anesthesia used?: Yes    Local anesthetic:  Topical anesthetic (Topical 4% Lidocaine Hydrochloride)    Wound Details:    Location:  Left leg    Type of Debridement:  Excisional       Length (cm):  4.6       Area (sq cm):  12.88       Width (cm):  2.8       Percent Debrided (%):  100       Depth (cm):  0.1       Total Area Debrided (sq cm):  12.88    Depth of debridement:  Subcutaneous tissue    Tissue debrided:  Subcutaneous    Devitalized tissue debrided:  Biofilm, Exudate, Slough and Fibrin    Instruments:  Curette    Bleeding:  Minimal  Hemostasis Achieved: Yes    Method Used:  Pressure  Patient tolerance:  Patient tolerated the procedure well with no immediate complications  "
No

## 2025-03-07 NOTE — PROGRESS NOTE ADULT - SUBJECTIVE AND OBJECTIVE BOX
Overnight events:  Remained afebrile in PACU    Subjective:  Pt feels better but c/o bladder spasms    Objective:    Vital signs  T(C): , Max: 37.2 (03-06-25 @ 17:26)  HR: 101 (03-07-25 @ 08:30)  BP: 115/81 (03-07-25 @ 08:00)  SpO2: 97% (03-07-25 @ 08:30)  Wt(kg): --    Output   Void: NR yet  03-06 @ 07:01  -  03-07 @ 07:00  --------------------------------------------------------  IN: 250 mL / OUT: 0 mL / NET: 250 mL    03-07 @ 07:01  -  03-07 @ 08:36  --------------------------------------------------------  IN: 150 mL / OUT: 0 mL / NET: 150 mL        Gen: NAD  Abd: soft, nontender  : voiding    Labs                        9.9    20.36 )-----------( 429      ( 07 Mar 2025 06:35 )             30.2     07 Mar 2025 06:35    134    |  102    |  19     ----------------------------<  78     3.6     |  20     |  1.27     Ca    9.3        07 Mar 2025 06:35

## 2025-03-07 NOTE — BRIEF OPERATIVE NOTE - NSICDXBRIEFPROCEDURE_GEN_ALL_CORE_FT
PROCEDURES:  Cystoscopy with retrograde pyelography with insertion of left ureteral stent 07-Mar-2025 06:13:17  Donovan Fong

## 2025-03-07 NOTE — PATIENT PROFILE ADULT - FALL HARM RISK - HARM RISK INTERVENTIONS

## 2025-03-07 NOTE — PROGRESS NOTE ADULT - ASSESSMENT
40 yo F h/o uterine fibroids transfered from  ED 3/6/2025 sepsis secondary to 1.2 mm L 12x6mm L UPJ stone w moderated pelvic caliectasis, L kidney diffusely enlarged, and a 3mm L mid ureteral stone  Fever Tmax 103F on 3/4/25 at home, given CTX at .  Pt admitted and taken emergently to the OR for left ureteral stent placement.     3/7: pt remained afebrile, c/o bladder spasms    Plan:  -AM labs reviewed  -continue CTX  -f/u GC cultures, OR cx  -IVF  -monitor VS/UO  -DVT prophy, IS, OOB, ambulate

## 2025-03-07 NOTE — ED ADULT NURSE REASSESSMENT NOTE - NS ED NURSE REASSESS COMMENT FT1
Break RN note - Patient resting in bed, breathing even and nonlabored. No acute distress. Patient complaining of left sided abdominal pain. Patient to be medicated as ordered. Safety maintained. Patient stable upon exiting the room.

## 2025-03-07 NOTE — ED PROVIDER NOTE - CARE PLAN
1 Principal Discharge DX:	Left renal stone  Secondary Diagnosis:	Acute UTI (urinary tract infection)

## 2025-03-08 ENCOUNTER — TRANSCRIPTION ENCOUNTER (OUTPATIENT)
Age: 40
End: 2025-03-08

## 2025-03-08 VITALS
SYSTOLIC BLOOD PRESSURE: 131 MMHG | OXYGEN SATURATION: 97 % | HEART RATE: 90 BPM | RESPIRATION RATE: 17 BRPM | DIASTOLIC BLOOD PRESSURE: 77 MMHG | TEMPERATURE: 98 F

## 2025-03-08 LAB
CULTURE RESULTS: SIGNIFICANT CHANGE UP
CULTURE RESULTS: SIGNIFICANT CHANGE UP
HCT VFR BLD CALC: 32.2 % — LOW (ref 34.5–45)
HGB BLD-MCNC: 10.6 G/DL — LOW (ref 11.5–15.5)
MCHC RBC-ENTMCNC: 28.5 PG — SIGNIFICANT CHANGE UP (ref 27–34)
MCHC RBC-ENTMCNC: 32.9 G/DL — SIGNIFICANT CHANGE UP (ref 32–36)
MCV RBC AUTO: 86.6 FL — SIGNIFICANT CHANGE UP (ref 80–100)
NRBC # BLD AUTO: 0 K/UL — SIGNIFICANT CHANGE UP (ref 0–0)
NRBC # FLD: 0 K/UL — SIGNIFICANT CHANGE UP (ref 0–0)
NRBC BLD AUTO-RTO: 0 /100 WBCS — SIGNIFICANT CHANGE UP (ref 0–0)
PLATELET # BLD AUTO: 645 K/UL — HIGH (ref 150–400)
RBC # BLD: 3.72 M/UL — LOW (ref 3.8–5.2)
RBC # FLD: 13.9 % — SIGNIFICANT CHANGE UP (ref 10.3–14.5)
SPECIMEN SOURCE: SIGNIFICANT CHANGE UP
SPECIMEN SOURCE: SIGNIFICANT CHANGE UP
WBC # BLD: 15.94 K/UL — HIGH (ref 3.8–10.5)
WBC # FLD AUTO: 15.94 K/UL — HIGH (ref 3.8–10.5)

## 2025-03-08 PROCEDURE — 99232 SBSQ HOSP IP/OBS MODERATE 35: CPT

## 2025-03-08 RX ORDER — ACETAMINOPHEN 500 MG/5ML
3 LIQUID (ML) ORAL
Qty: 0 | Refills: 0 | DISCHARGE
Start: 2025-03-08

## 2025-03-08 RX ORDER — PHENAZOPYRIDINE HCL 100 MG
1 TABLET ORAL
Qty: 6 | Refills: 0
Start: 2025-03-08 | End: 2025-03-09

## 2025-03-08 RX ORDER — POLYETHYLENE GLYCOL 3350 17 G/17G
17 POWDER, FOR SOLUTION ORAL
Qty: 0 | Refills: 0 | DISCHARGE
Start: 2025-03-08

## 2025-03-08 RX ORDER — CIPROFLOXACIN HCL 250 MG
1 TABLET ORAL
Qty: 24 | Refills: 0
Start: 2025-03-08 | End: 2025-03-19

## 2025-03-08 RX ORDER — TAMSULOSIN HYDROCHLORIDE 0.4 MG/1
1 CAPSULE ORAL
Qty: 30 | Refills: 0
Start: 2025-03-08 | End: 2025-04-06

## 2025-03-08 RX ORDER — SENNA 187 MG
2 TABLET ORAL
Qty: 0 | Refills: 0 | DISCHARGE
Start: 2025-03-08

## 2025-03-08 RX ORDER — FLUCONAZOLE 150 MG
1 TABLET ORAL
Qty: 1 | Refills: 3
Start: 2025-03-08

## 2025-03-08 RX ORDER — IBUPROFEN 200 MG
1 TABLET ORAL
Qty: 30 | Refills: 0
Start: 2025-03-08

## 2025-03-08 RX ADMIN — OXYBUTYNIN CHLORIDE 5 MILLIGRAM(S): 5 TABLET, FILM COATED, EXTENDED RELEASE ORAL at 06:14

## 2025-03-08 RX ADMIN — KETOROLAC TROMETHAMINE 15 MILLIGRAM(S): 30 INJECTION, SOLUTION INTRAMUSCULAR; INTRAVENOUS at 01:30

## 2025-03-08 RX ADMIN — Medication 1000 MILLIGRAM(S): at 00:32

## 2025-03-08 RX ADMIN — Medication 4 MILLIGRAM(S): at 06:27

## 2025-03-08 RX ADMIN — Medication 200 MILLIGRAM(S): at 06:15

## 2025-03-08 RX ADMIN — HEPARIN SODIUM 5000 UNIT(S): 1000 INJECTION INTRAVENOUS; SUBCUTANEOUS at 06:15

## 2025-03-08 RX ADMIN — Medication 975 MILLIGRAM(S): at 07:05

## 2025-03-08 RX ADMIN — Medication 975 MILLIGRAM(S): at 11:39

## 2025-03-08 RX ADMIN — KETOROLAC TROMETHAMINE 15 MILLIGRAM(S): 30 INJECTION, SOLUTION INTRAMUSCULAR; INTRAVENOUS at 02:30

## 2025-03-08 RX ADMIN — Medication 40 MILLIGRAM(S): at 07:05

## 2025-03-08 RX ADMIN — KETOROLAC TROMETHAMINE 15 MILLIGRAM(S): 30 INJECTION, SOLUTION INTRAMUSCULAR; INTRAVENOUS at 06:15

## 2025-03-08 NOTE — DISCHARGE NOTE NURSING/CASE MANAGEMENT/SOCIAL WORK - NSDCPNINST_GEN_ALL_CORE
call md for any worsening pain or unable to urinate. drink plenty of fluids. inform md for fever above 100.4 .observe for any bleeding. follow up in doctors office. Schedule follow-up appointment(s) as instructed for stent removal in office. Notify physician for signs/symptoms of infection, including chills and/or fever greater than 101 deg F; nausea, vomiting, and/or diarrhea; increased pain and/or pain not relieved by medications; increased swelling, redness, and/or drainage at incision site(s). You may have intermittent blood tinged urine and slight flank pain when you urinate. This is normal and due to the stent in your ureter. If your urine becomes bright red or with clots, please call the office. No heavy lifting or straining for 2 to 4 weeks. Drink plenty of fluids and take over-the-counter stool softeners to prevent constipation, which can be a side effect of some pain medications.

## 2025-03-08 NOTE — DISCHARGE NOTE PROVIDER - NSDCCPCAREPLAN_GEN_ALL_CORE_FT
PRINCIPAL DISCHARGE DIAGNOSIS  Diagnosis: Left renal stone  Assessment and Plan of Treatment: You may have intermittent blood tinged urine and slight flank pain when you urinate.  This is normal and due to the stent in your ureter.   If your urine becomes bright red or with clots, please call the office.  You have a ureteral stent to help with tissue healing and drainage of the kidney. Take flomax daily for stent discomfort. The stent is temporary, and must be eventually removed or it may cause problems with infection and kidney damage if left in place greater than 3 months.  Dr. Henry's office will call you with a follow up appointment for stent/stone removal and further management.  Call the office if you have fever greater than 101, difficulty urinating, pain not relieved with pain medication, nausea/vomiting.      SECONDARY DISCHARGE DIAGNOSES  Diagnosis: Acute UTI (urinary tract infection)  Assessment and Plan of Treatment: Take antibiotic until finished

## 2025-03-08 NOTE — DISCHARGE NOTE PROVIDER - HOSPITAL COURSE
38 yo F h/o uterine fibroids transferred from  ED 3/6/2025 with sepsis secondary to 1.2 mm L 12x6mm L UPJ stone w moderated pelvic caliectasis, L kidney diffusely enlarged, and a 3mm L mid ureteral stone  Fever Tmax 103F at home on 3/4/25.given CTX at .  Pt admitted and taken emergently to the OR for left ureteral stent placement.     3/7: pt remained afebrile, c/o bladder spasms  3/8: remains afebrile, intermittent stent colic, Bcx NGTD @ 24hr, Ucx NGUF, ORcx pending, pt d/c on cipro to f/u Dr. Henry.

## 2025-03-08 NOTE — DISCHARGE NOTE NURSING/CASE MANAGEMENT/SOCIAL WORK - NSDCPNDISPN_GEN_ALL_CORE
Education provided on the pain management plan of care/Side effects of pain management treatment/Activities of daily living, including home environment that might     exacerbate pain or reduce effectiveness of the pain management plan of care as well as strategies to address these issues/Safe use, storage and disposal of opioids when prescribed/Opioids not applicable/not prescribed Education provided on the pain management plan of care/Side effects of pain management treatment/Activities of daily living, including home environment that might     exacerbate pain or reduce effectiveness of the pain management plan of care as well as strategies to address these issues/Opioids not applicable/not prescribed

## 2025-03-08 NOTE — PROGRESS NOTE ADULT - ATTENDING COMMENTS
As above  Afebrile  blood cultures negative  Urine culture normal  margarita  Pt eager to be discharged  Will keep on culture list  cipro twice daily for 2 weeks total antibiotics course  outpatient follow up

## 2025-03-08 NOTE — DISCHARGE NOTE NURSING/CASE MANAGEMENT/SOCIAL WORK - FINANCIAL ASSISTANCE
[facility] provides services at a reduced cost to those who are determined to be eligible through [facility]’s financial assistance program. Information regarding [facility]’s financial assistance program can be found by going to [email or web address] or by calling [phone].

## 2025-03-08 NOTE — PROGRESS NOTE ADULT - SUBJECTIVE AND OBJECTIVE BOX
Overnight events:  None, remained afebrile    Subjective:  Pt c/o intermittent stent colic, voiding well    Objective:    Vital signs  T(C): , Max: 36.7 (03-08-25 @ 01:32)  HR: 90 (03-08-25 @ 09:08)  BP: 131/77 (03-08-25 @ 09:08)  SpO2: 97% (03-08-25 @ 09:08)  Wt(kg): --    Output   Void: 650  03-07 @ 07:01  -  03-08 @ 07:00  --------------------------------------------------------  IN: 2049 mL / OUT: 1330 mL / NET: 719 mL    03-08 @ 06:01  -  03-08 @ 09:55  --------------------------------------------------------  IN: 0 mL / OUT: 150 mL / NET: -150 mL        Gen: NAD  Abd: soft, nontender  : voiding    Labs                        10.6   15.94 )-----------( 645      ( 08 Mar 2025 07:35 )             32.2     07 Mar 2025 06:35    134    |  102    |  19     ----------------------------<  78     3.6     |  20     |  1.27     Ca    9.3        07 Mar 2025 06:35    Bcx: NGTD  Ucx: NGUF  ORcx: pending

## 2025-03-08 NOTE — DISCHARGE NOTE PROVIDER - NSDCMRMEDTOKEN_GEN_ALL_CORE_FT
acetaminophen 325 mg oral tablet: 3 tab(s) orally every 6 hours as needed for pain, alternate with ibuprofen  ciprofloxacin 500 mg oral tablet: 1 tab(s) orally 2 times a day  fluconazole 150 mg oral tablet: 1 tab(s) orally once a day  ibuprofen 600 mg oral tablet: 1 tab(s) orally every 6 hours as needed for pain, alternate with acetaminophen  phenazopyridine 200 mg oral tablet: 1 tab(s) orally every 8 hours  polyethylene glycol 3350 oral powder for reconstitution: 17 gram(s) orally once a day as needed for  constipation  senna leaf extract oral tablet: 2 tab(s) orally once a day (at bedtime) As needed Constipation  tamsulosin 0.4 mg oral capsule: 1 cap(s) orally once a day (at bedtime)

## 2025-03-08 NOTE — DISCHARGE NOTE NURSING/CASE MANAGEMENT/SOCIAL WORK - PATIENT PORTAL LINK FT
You can access the FollowMyHealth Patient Portal offered by [facility] by registering at the following website: [email or web address]. By joining [facility]’s FollowMyHealth portal, you will also be able to view your health information using other applications (apps) compatible with our system.

## 2025-03-08 NOTE — PROGRESS NOTE ADULT - ASSESSMENT
40 yo F h/o uterine fibroids transferred from  ED 3/6/2025 sepsis secondary to 1.2 mm L 12x6mm L UPJ stone w moderated pelvic caliectasis, L kidney diffusely enlarged, and a 3mm L mid ureteral stone  Fever Tmax 103F on 3/4/25 at home, given CTX at .  Pt admitted and taken emergently to the OR for left ureteral stent placement.     3/7: pt remained afebrile, c/o bladder spasms  3/8: remains afebrile, intermittent stent colic, Bcx NGTD @ 24hr, Ucx NGUF, ORcx pending    Plan:  -AM CBC reviewed  -continue CTX  -f/u final Bcx, OR cx  -IVL  -monitor VS/UO  -DVT prophy, IS, OOB, ambulate  -f/u with Dr. Henry as outpt

## 2025-03-08 NOTE — DISCHARGE NOTE PROVIDER - CARE PROVIDER_API CALL
Fabian Henry  Urology  [address] [place] [id number]  Phone: [phone]  Fax: [phone]  Follow Up Time:    [Family Functioning] : family functioning [Nutritional Adequacy and Growth] : nutritional adequacy and growth [Infant Development] : infant development [Oral Health] : oral health [Safety] : safety [] : The components of the vaccine(s) to be administered today are listed in the plan of care. The disease(s) for which the vaccine(s) are intended to prevent and the risks have been discussed with the caretaker.  The risks are also included in the appropriate vaccination information statements which have been provided to the patient's caregiver.  The caregiver has given consent to vaccinate. [FreeTextEntry1] : Recommend breastfeeding, 8-12 feedings per day. Mother should continue prenatal vitamins and avoid alcohol. If formula is needed, recommend iron-fortified formulations, 2-4 oz every 3-4 hrs. Cereal may be introduced using a spoon and bowl. When in car, patient should be in rear-facing car seat in back seat. Put baby to sleep on back, in own crib with no loose or soft bedding. Lower crib matress. Help baby to maintain sleep and feeding routines. May offer pacifier if needed. Continue tummy time when awake\par \par Rotateq and Prevnar given\par return in 1 week for Pentacel

## 2025-03-12 LAB
CULTURE RESULTS: SIGNIFICANT CHANGE UP
CULTURE RESULTS: SIGNIFICANT CHANGE UP
SPECIMEN SOURCE: SIGNIFICANT CHANGE UP
SPECIMEN SOURCE: SIGNIFICANT CHANGE UP

## (undated) DEVICE — TUBING LEVEL ONE NORMOFLO SET

## (undated) DEVICE — ADAPTER CHECK FLO 9FR STERILE

## (undated) DEVICE — POSITIONER STRAP ARMBOARD VELCRO TS-30

## (undated) DEVICE — CANISTER DISPOSABLE THIN WALL 3000CC

## (undated) DEVICE — DRAPE COVER SNAP 36X30"

## (undated) DEVICE — PACK CYSTO

## (undated) DEVICE — TUBING THERMADX UROLOGY

## (undated) DEVICE — VENODYNE/SCD SLEEVE CALF MEDIUM

## (undated) DEVICE — WARMING BLANKET UPPER ADULT

## (undated) DEVICE — IRR BULB PATHFINDER + 10"

## (undated) DEVICE — SOL IRR BAG NS 0.9% 3000ML

## (undated) DEVICE — SOL IRR POUR H2O 500ML